# Patient Record
Sex: FEMALE | Race: WHITE
[De-identification: names, ages, dates, MRNs, and addresses within clinical notes are randomized per-mention and may not be internally consistent; named-entity substitution may affect disease eponyms.]

---

## 2022-06-30 ENCOUNTER — HOSPITAL ENCOUNTER (EMERGENCY)
Dept: HOSPITAL 4 - SED | Age: 52
Discharge: LEFT BEFORE DISCHARGE | End: 2022-06-30
Payer: COMMERCIAL

## 2022-06-30 VITALS — SYSTOLIC BLOOD PRESSURE: 98 MMHG

## 2022-06-30 VITALS — BODY MASS INDEX: 18.1 KG/M2 | HEIGHT: 64 IN | WEIGHT: 106 LBS

## 2022-06-30 DIAGNOSIS — Z79.899: ICD-10-CM

## 2022-06-30 DIAGNOSIS — K29.70: ICD-10-CM

## 2022-06-30 DIAGNOSIS — R10.11: ICD-10-CM

## 2022-06-30 DIAGNOSIS — K59.00: Primary | ICD-10-CM

## 2022-06-30 DIAGNOSIS — R11.2: ICD-10-CM

## 2022-06-30 LAB
ALBUMIN SERPL BCP-MCNC: 3.2 G/DL (ref 3.4–4.8)
ALT SERPL W P-5'-P-CCNC: 11 U/L (ref 12–78)
ANION GAP SERPL CALCULATED.3IONS-SCNC: 9 MMOL/L (ref 5–15)
AST SERPL W P-5'-P-CCNC: 11 U/L (ref 10–37)
BASOPHILS # BLD AUTO: 0 K/UL (ref 0–0.2)
BASOPHILS NFR BLD AUTO: 0.3 % (ref 0–2)
BILIRUB SERPL-MCNC: 0.4 MG/DL (ref 0–1)
BUN SERPL-MCNC: 7 MG/DL (ref 8–21)
CALCIUM SERPL-MCNC: 8.1 MG/DL (ref 8.4–11)
CHLORIDE SERPL-SCNC: 104 MMOL/L (ref 98–107)
CREAT SERPL-MCNC: 0.67 MG/DL (ref 0.55–1.3)
EOSINOPHIL # BLD AUTO: 0.1 K/UL (ref 0–0.4)
EOSINOPHIL NFR BLD AUTO: 1.1 % (ref 0–4)
ERYTHROCYTE [DISTWIDTH] IN BLOOD BY AUTOMATED COUNT: 14.2 % (ref 9–15)
GFR SERPL CREATININE-BSD FRML MDRD: 118 ML/MIN (ref 90–?)
GLUCOSE SERPL-MCNC: 84 MG/DL (ref 70–99)
HCT VFR BLD AUTO: 37.6 % (ref 36–48)
HGB BLD-MCNC: 12.6 G/DL (ref 12–16)
LIPASE SERPL-CCNC: 195 U/L (ref 73–393)
LYMPHOCYTES # BLD AUTO: 1.6 K/UL (ref 1–5.5)
LYMPHOCYTES NFR BLD AUTO: 25.5 % (ref 20.5–51.5)
MCH RBC QN AUTO: 29 PG (ref 27–31)
MCHC RBC AUTO-ENTMCNC: 34 % (ref 32–36)
MCV RBC AUTO: 87 FL (ref 79–98)
MONOCYTES # BLD MANUAL: 0.3 K/UL (ref 0–1)
MONOCYTES # BLD MANUAL: 5.4 % (ref 1.7–9.3)
NEUTROPHILS # BLD AUTO: 4.2 K/UL (ref 1.8–7.7)
NEUTROPHILS NFR BLD AUTO: 67.7 % (ref 40–70)
PLATELET # BLD AUTO: 239 K/UL (ref 130–430)
POTASSIUM SERPL-SCNC: 4 MMOL/L (ref 3.5–5.1)
RBC # BLD AUTO: 4.35 MIL/UL (ref 4.2–6.2)
SODIUM SERPLBLD-SCNC: 141 MMOL/L (ref 136–145)
WBC # BLD AUTO: 6.2 K/UL (ref 4.8–10.8)

## 2022-06-30 NOTE — NUR
DR GARCIA TO WAITING ROOM TO EVALUATE PT.
DR MEADOWS OUT TO SEE PT AND UNABLE TO FIND PT IN WAITING ROOM. PT FIFI

-------------------------------------------------------------------------------

Addendum: 06/30/22 at 1748 by LIZ

-------------------------------------------------------------------------------

NOT RUBEN CALIX
DR MEADOWS OUT TO SEE PT IN WAITING ROOM, UNABLE TO FIND PT. PT ELOPED
Pt c/o epigstric pain x3 months non-radiating. States she has gone to different 
hospitals but nothing has been found she states. Rates pain 9/10. Pt is A&Ox4. 
Skin intact. VSS. Coming from home accompanied by family member. Pt in 
wheelchair unable to ambulate. Allergic to Reglan. Has hx of HTN, 
Hyperlipidemia and C4 spinal cord injury. No chest pain and no sob. Denies n/v.
Triaged pt and pt placed in waiting room until a bed becomes available. VSS. 
A&Ox4. Dr. Beck made aware.
1)Re-gain 100% BW 2)Avg wt gain >/=20-35 Gm/d 3)Intake >/=120cal/Kg/d 4)>10th %ile wt/age at D/C